# Patient Record
(demographics unavailable — no encounter records)

---

## 2025-03-18 NOTE — DISCUSSION/SUMMARY
[de-identified] : modify activities use elastic sleeve for structural support try OTC meds ice as needed try topical lidocaine for pain control reviewed current medications used by this patient home exercises for functional return  RE:  WILTON ROGERS   [id number]   Attention:  Nurse Reviewer /Medical Director    Based on my patient's condition, I strongly believe that the MRI R knee is medically.necessary.   The patient has failed oral meds, injections and PT and conservative treatment in combination or by themselves and therefore needs the MRI.   The MRI will dictate further treatment t recommendations.

## 2025-03-18 NOTE — PHYSICAL EXAM
[5___] : quadriceps 5[unfilled]/5 [Positive] : positive Joselo [] : mildly antalgic [Right] : right knee [All Views] : anteroposterior, lateral, skyline, and anteroposterior standing [FreeTextEntry9] : poss ocd mfc [TWNoteComboBox7] : flexion 130 degrees

## 2025-03-18 NOTE — HISTORY OF PRESENT ILLNESS
[de-identified] : Patient is a 41 y.o female presenting with right knee pain, no reported injury but was wresting with her young child and may have twisted it, some swelling initialy Patient states pain started a few months ago and has worsened over time; denies N/T, Treid OTCs

## 2025-04-09 NOTE — DISCUSSION/SUMMARY
[de-identified] : Patient allowed to gently start resuming activities. Discussed change to medication prescription and usage. Bracing options discussed with patient for stability and support. Activity modification as needed Discussed poss future surgery, pt deciding, questions answered, no guarantees try topical lidocaine for pain control reviewed current medications used by this patient Home exercises for functional return letter of med necesity for R knee arthroscopy OCD fixaion vs excision and debridement and chondroplasty no guarantees, will need to be PWB with crutches and KI for about 3 months

## 2025-04-09 NOTE — DATA REVIEWED
[MRI] : MRI [Right] : of the right [Knee] : knee [Report was reviewed and noted in the chart] : The report was reviewed and noted in the chart [I reviewed the films/CD and agree] : I reviewed the films/CD and agree [FreeTextEntry1] : large anterolateral femoral condyle unstable OCD, small jouint effusion.

## 2025-04-09 NOTE — PHYSICAL EXAM
[Right] : right knee [5___] : quadriceps 5[unfilled]/5 [] : negative Lachmann [Equivocal] : equivocal Joselo [TWNoteComboBox7] : flexion 130 degrees

## 2025-04-09 NOTE — HISTORY OF PRESENT ILLNESS
[Rest] : rest [Meds] : meds [Standing] : standing [Walking] : walking [Stairs] : stairs [de-identified] : still with right knee pain still  and csi helped for a couple of days, uses med on occasion.  Patient states pain started a few months ago and has worsened over time; Underwent MRI of the knee.   [] : no [FreeTextEntry1] : Right Knee [FreeTextEntry5] : Patient has been experiencing pain and swelling in the knee. [de-identified] : Dr. Gibbons

## 2025-04-30 NOTE — HISTORY OF PRESENT ILLNESS
[10] : 10 [Dull/Aching] : dull/aching [Sharp] : sharp [Rest] : rest [Meds] : meds [Standing] : standing [Walking] : walking [Stairs] : stairs [de-identified] : still with right knee pain still  and csi helped for a couple of days, uses med on occasion.  Patient states pain started a few months ago and has worsened over time; Has known unstable OCD LFC, has abrasion and fell yesterday at home as knee buckled  [] : no [FreeTextEntry1] : Right Knee [FreeTextEntry5] : Patient has been experiencing pain and swelling in the knee. [de-identified] : Dr. Gibbons

## 2025-04-30 NOTE — PHYSICAL EXAM
[5___] : quadriceps 5[unfilled]/5 [Equivocal] : equivocal Joselo [] : mildly antalgic [Right] : right knee [All Views] : anteroposterior, lateral, skyline, and anteroposterior standing [There are no fractures, subluxations or dislocations. No significant abnormalities are seen] : There are no fractures, subluxations or dislocations. No significant abnormalities are seen [TWNoteComboBox7] : flexion 105 degrees

## 2025-04-30 NOTE — DISCUSSION/SUMMARY
[de-identified] : Patient allowed to gently start resuming activities. Discussed change to medication prescription and usage. Bracing options discussed with patient for stability and support. Activity modification as needed Discussed poss future surgery, pt deciding, questions answered, no guarantees try topical lidocaine for pain control reviewed current medications used by this patient Home exercises for functional return letter of med necessity for R knee arthroscopy OCD fixaion vs excision and debridement and chondroplasty  I feel this is a complete disservice to have ot allowed the pt surgery and now she had an injury and fall due to knee buckle 04/30/2025    RE:  WILTON ROGERS   Acct #- [id number]    Attention:  Nurse Reviewer /Medical Director  I am writing this letter as a medical necessity for PT program. Patient has tried analgesics, non-steroid anti-inflammatory agents,  hot or cold compresses,injections of corticosteroids, etc)  which in combination or by themselves has not worked. Based on my patient's condition, I strongly believe that the PT is medically needed.   Thank you for your time and consideration.

## 2025-05-21 NOTE — PHYSICAL EXAM
[Right] : right knee [AP] : anteroposterior [Lateral] : lateral [There are no fractures, subluxations or dislocations. No significant abnormalities are seen] : There are no fractures, subluxations or dislocations. No significant abnormalities are seen [] : no calf tenderness [TWNoteComboBox7] : flexion 110 degrees

## 2025-05-21 NOTE — HISTORY OF PRESENT ILLNESS
[de-identified] : Patient s/p right knee arthroscopic OCD fixation LFC and pinning and removal of loose body, on 5/13/25. She is well maintained in a knee immobilizer. No fever/chills. Has persistent pain in the knee. No calf pain.  [FreeTextEntry5] : Patient notes pain and swelling in the knee.

## 2025-05-21 NOTE — DISCUSSION/SUMMARY
[de-identified] : modify activities use elastic sleeve for structural support try OTC meds ice as needed try topical lidocaine for pain control reviewed current medications used by this patient home exercises for functional return cont with KI when walking and walker new mri in 3 months I discussed with her and her dad that she has more pain than I would expect from OCD pre-op and still post-op but I'm hopeful that pinning it will decrease some of her pain. I do feel that her anxiety condtiion does contribute to her pain and reinforced as I did pre-op that I can not guarantee how much if any of her pain will go away

## 2025-06-11 NOTE — PHYSICAL EXAM
[] : ligamentously stable [Right] : right knee [AP] : anteroposterior [There are no fractures, subluxations or dislocations. No significant abnormalities are seen] : There are no fractures, subluxations or dislocations. No significant abnormalities are seen [Lateral] : lateral [FreeTextEntry9] : ocd lfc [TWNoteComboBox7] : flexion 100 degrees

## 2025-06-11 NOTE — HISTORY OF PRESENT ILLNESS
[Constant] : constant [Leisure] : leisure [Sleep] : sleep [Rest] : rest [Meds] : meds [Ice] : ice [Standing] : standing [Walking] : walking [Stairs] : stairs [de-identified] : Patient s/p right knee arthroscopic OCD fixation LFC and pinning and removal of loose body, on 5/13/25. She is well maintained in a knee immobilizer. No fever/chills. Has persistent pain in the knee. No calf pain.  [FreeTextEntry5] : Patient notes pain and swelling in the knee. [] : no

## 2025-06-11 NOTE — DISCUSSION/SUMMARY
[de-identified] : modify activities use elastic sleeve for structural support try OTC meds ice as needed try topical lidocaine for pain control reviewed current medications used by this patient home exercises for functional return cont with KI when walking and walker pain meds as needed MRA if no resolution

## 2025-06-11 NOTE — REASON FOR VISIT
[FreeTextEntry2] : right knee post op was starting to improve then fell on hard tile surface in bathroom several days ago, has KI and walker

## 2025-06-25 NOTE — HISTORY OF PRESENT ILLNESS
[Dull/Aching] : dull/aching [Sharp] : sharp [Leisure] : leisure [Sleep] : sleep [Rest] : rest [Meds] : meds [Standing] : standing [Walking] : walking [Stairs] : stairs [de-identified] : Patient s/p right knee arthroscopic OCD fixation LFC and pinning and removal of loose body, on 5/13/25. She is well maintained in a knee immobilizer. Has worse pain after fall at home over 2 weeks ago at home, less but bruising still present,  [Constant] : constant [Ice] : ice [] : yes [FreeTextEntry1] : Right Knee [FreeTextEntry5] : Patient notes pain and swelling in the knee.

## 2025-06-25 NOTE — DISCUSSION/SUMMARY
[de-identified] : modify activities use elastic sleeve for structural support try OTC meds ice as needed try topical lidocaine for pain control reviewed current medications used by this patient home exercises for functional return cont with KI when walking and walker pain meds as needed letter of med necessity MRA if no resolution

## 2025-06-25 NOTE — PHYSICAL EXAM
[] : ligamentously stable [Right] : right knee [AP] : anteroposterior [Lateral] : lateral [There are no fractures, subluxations or dislocations. No significant abnormalities are seen] : There are no fractures, subluxations or dislocations. No significant abnormalities are seen [FreeTextEntry3] : STS [FreeTextEntry9] : ocd lfc [TWNoteComboBox7] : flexion 60 degrees